# Patient Record
Sex: MALE | Race: OTHER | URBAN - METROPOLITAN AREA
[De-identification: names, ages, dates, MRNs, and addresses within clinical notes are randomized per-mention and may not be internally consistent; named-entity substitution may affect disease eponyms.]

---

## 2024-06-10 ENCOUNTER — EMERGENCY (EMERGENCY)
Facility: HOSPITAL | Age: 89
LOS: 0 days | Discharge: HOME | End: 2024-06-11
Attending: EMERGENCY MEDICINE
Payer: SELF-PAY

## 2024-06-10 VITALS
RESPIRATION RATE: 20 BRPM | TEMPERATURE: 98 F | WEIGHT: 143.08 LBS | HEIGHT: 63 IN | OXYGEN SATURATION: 98 % | DIASTOLIC BLOOD PRESSURE: 79 MMHG | SYSTOLIC BLOOD PRESSURE: 174 MMHG | HEART RATE: 69 BPM

## 2024-06-10 DIAGNOSIS — M25.562 PAIN IN LEFT KNEE: ICD-10-CM
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
## 2024-06-10 DIAGNOSIS — S50.02XA CONTUSION OF LEFT ELBOW, INITIAL ENCOUNTER: ICD-10-CM

## 2024-06-10 DIAGNOSIS — S80.02XA CONTUSION OF LEFT KNEE, INITIAL ENCOUNTER: ICD-10-CM

## 2024-06-10 DIAGNOSIS — M25.522 PAIN IN LEFT ELBOW: ICD-10-CM
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
## 2024-06-10 PROCEDURE — 73562 X-RAY EXAM OF KNEE 3: CPT | Mod: 26,LT

## 2024-06-10 PROCEDURE — 73080 X-RAY EXAM OF ELBOW: CPT | Mod: 26,LT

## 2024-06-10 PROCEDURE — 99284 EMERGENCY DEPT VISIT MOD MDM: CPT

## 2024-06-10 RX ORDER — ACETAMINOPHEN 500 MG
975 TABLET ORAL ONCE
Refills: 0 | Status: COMPLETED | OUTPATIENT
Start: 2024-06-10 | End: 2024-06-10

## 2024-06-10 RX ORDER — LOSARTAN POTASSIUM 100 MG/1
1 TABLET, FILM COATED ORAL
Refills: 0 | DISCHARGE

## 2024-06-10 RX ORDER — METFORMIN HYDROCHLORIDE 850 MG/1
1 TABLET ORAL
Refills: 0 | DISCHARGE

## 2024-06-10 RX ADMIN — Medication 975 MILLIGRAM(S): at 22:15

## 2024-06-10 NOTE — ED PROVIDER NOTE - ENMT, MLM
Airway patent, Nasal mucosa clear. Mouth with normal mucosa. Throat has no vesicles, no oropharyngeal exudates and uvula is midline. no hematoma no wound to the scalp and face.

## 2024-06-10 NOTE — ED ADULT NURSE NOTE - NSFALLASSESSNEED_ED_ALL_ED
Quality 130: Documentation Of Current Medications In The Medical Record: Current Medications Documented Quality 431: Preventive Care And Screening: Unhealthy Alcohol Use - Screening: Patient not identified as an unhealthy alcohol user when screened for unhealthy alcohol use using a systematic screening method Quality 110: Preventive Care And Screening: Influenza Immunization: Influenza Immunization previously received during influenza season Quality 226: Preventive Care And Screening: Tobacco Use: Screening And Cessation Intervention: Patient screened for tobacco use and is an ex/non-smoker Detail Level: Detailed no

## 2024-06-10 NOTE — ED PROVIDER NOTE - MUSCULOSKELETAL MINIMAL EXAM
Pt is able active flex/extend/adduct/abduct in all joints against resistance of bilateral upper and lower extremties/atraumatic/normal range of motion/neck supple/motor intact

## 2024-06-10 NOTE — ED PROVIDER NOTE - MUSCULOSKELETAL NECK EXAM
no vertebral points tenderness no pain on movements/no deformity, pain or tenderness. no restriction of movement/supple/VERTEBRAL POINT TENDERNESS

## 2024-06-10 NOTE — ED PROVIDER NOTE - LOWER EXTREMITY EXAM, LEFT
no effusion nontender, full range of motion and able to ambulate with nromal gaits without assists/normal

## 2024-06-10 NOTE — ED PROVIDER NOTE - CARE PROVIDER_API CALL
Shaggy Carrera  Orthopaedic Surgery  [address] [place] [address]  Phone: [phone]  Fax: [phone]  Follow Up Time: 1-3 Days

## 2024-06-10 NOTE — ED PROVIDER NOTE - CLINICAL SUMMARY MEDICAL DECISION MAKING FREE TEXT BOX
pt walked in with daughter c/o slight left elbow and left knee pain after slipped on the wet floor at a store at 19:00 pm this after evening. Pt however sts he did not hit his head no dizziness no headache no neck pain no blurred visions, ct of head is not indicated now. Pt has slight tender to left elbow but able to flex/extend left elbow and left knee against resistance, pt is also able to walked with normal gaits without assists xray of left elbow and left knee are ordered fx or dislocation the left elbow and left knee are unlikely. pt walked in with daughter c/o slight left elbow and left knee pain after slipped on the wet floor at a store at 19:00 pm this after evening. Pt however sts he did not hit his head no dizziness no headache no neck pain no blurred visions, ct of head is not indicated now. Pt has slight tender to left elbow but able to flex/extend left elbow and left knee against resistance, pt is also able to walked with normal gaits without assists xray of left elbow and left knee are ordered fx or dislocation the left elbow and left knee are unlikely.  xray of left elbow and left knee no acute fx or dislocation

## 2024-06-10 NOTE — ED PROVIDER NOTE - OBJECTIVE STATEMENT
88 years old male brought in by the daughter c/o left elbow and knee pain after he slipped on the wet floor in the [employer] store at 19:00 pm this evening. She sts pt did not passed out and not taking any blood thinning medication. Pt denies trauma to the head. loc, headache, dizziness, blurred visions, light sensitivities, focal/distal weakness or numbness, neck/back/hips/calfs pain, difficulty to walk or keep balance, cough, sob, chest pain, nausea, vomiting, fever, chills, abd pain, dysuria, hematuria or irregular bowel movements.

## 2024-06-10 NOTE — ED PROVIDER NOTE - PATIENT PORTAL LINK FT
You can access the FollowMyHealth Patient Portal offered by [facility] by registering at the following website: [email or web address]. By joining [email or web address]’s FollowMyHealth portal, you will also be able to view your health information using other applications (apps) compatible with our system.

## 2024-06-10 NOTE — ED ADULT TRIAGE NOTE - CHIEF COMPLAINT QUOTE
pt s/p fall on wet floor at [employer] today. PW L elbow pain. pt denies LOC, is concerned about L knee fell on L knee  had replacement years ago. denies LOC.

## 2024-06-10 NOTE — ED PROVIDER NOTE - UPPER EXTREMITY EXAM, LEFT
slight tender to palp medial left elbow but able to perform flex/extension against resistance/TENDERNESS

## 2024-06-10 NOTE — ED ADULT NURSE NOTE - OBJECTIVE STATEMENT
as per patient's daughter " today at the store he slipped and fell, injured his left elbow and left knee. Denies LOC. "

## 2024-06-11 VITALS
OXYGEN SATURATION: 98 % | RESPIRATION RATE: 16 BRPM | DIASTOLIC BLOOD PRESSURE: 70 MMHG | HEART RATE: 60 BPM | SYSTOLIC BLOOD PRESSURE: 138 MMHG | TEMPERATURE: 98 F